# Patient Record
Sex: FEMALE | Race: ASIAN | Employment: UNEMPLOYED
[De-identification: names, ages, dates, MRNs, and addresses within clinical notes are randomized per-mention and may not be internally consistent; named-entity substitution may affect disease eponyms.]

---

## 2022-11-21 SDOH — HEALTH STABILITY: PHYSICAL HEALTH: ON AVERAGE, HOW MANY MINUTES DO YOU ENGAGE IN EXERCISE AT THIS LEVEL?: 30 MIN

## 2022-11-21 SDOH — HEALTH STABILITY: PHYSICAL HEALTH: ON AVERAGE, HOW MANY DAYS PER WEEK DO YOU ENGAGE IN MODERATE TO STRENUOUS EXERCISE (LIKE A BRISK WALK)?: 5 DAYS

## 2022-11-22 ENCOUNTER — OFFICE VISIT (OUTPATIENT)
Dept: INTERNAL MEDICINE CLINIC | Facility: CLINIC | Age: 22
End: 2022-11-22
Payer: COMMERCIAL

## 2022-11-22 VITALS
WEIGHT: 105 LBS | HEART RATE: 110 BPM | SYSTOLIC BLOOD PRESSURE: 98 MMHG | HEIGHT: 60 IN | OXYGEN SATURATION: 100 % | DIASTOLIC BLOOD PRESSURE: 58 MMHG | TEMPERATURE: 99 F | BODY MASS INDEX: 20.62 KG/M2

## 2022-11-22 DIAGNOSIS — D50.8 IRON DEFICIENCY ANEMIA SECONDARY TO INADEQUATE DIETARY IRON INTAKE: ICD-10-CM

## 2022-11-22 DIAGNOSIS — R41.840 DIFFICULTY CONCENTRATING: ICD-10-CM

## 2022-11-22 DIAGNOSIS — Z78.9 KETOGENIC DIET: ICD-10-CM

## 2022-11-22 DIAGNOSIS — Z83.49 FAMILY HISTORY OF HYPOTHYROIDISM: ICD-10-CM

## 2022-11-22 DIAGNOSIS — Z76.89 ENCOUNTER TO ESTABLISH CARE: ICD-10-CM

## 2022-11-22 DIAGNOSIS — D50.8 IRON DEFICIENCY ANEMIA SECONDARY TO INADEQUATE DIETARY IRON INTAKE: Primary | ICD-10-CM

## 2022-11-22 DIAGNOSIS — F51.04 PSYCHOPHYSIOLOGICAL INSOMNIA: ICD-10-CM

## 2022-11-22 LAB
BASOPHILS # BLD: 0 K/UL (ref 0–0.2)
BASOPHILS NFR BLD: 0 % (ref 0–2)
DIFFERENTIAL METHOD BLD: ABNORMAL
EOSINOPHIL # BLD: 0.2 K/UL (ref 0–0.8)
EOSINOPHIL NFR BLD: 2 % (ref 0.5–7.8)
ERYTHROCYTE [DISTWIDTH] IN BLOOD BY AUTOMATED COUNT: 17.2 % (ref 11.9–14.6)
HCT VFR BLD AUTO: 35.8 % (ref 35.8–46.3)
HGB BLD-MCNC: 10.6 G/DL (ref 11.7–15.4)
IMM GRANULOCYTES # BLD AUTO: 0 K/UL (ref 0–0.5)
IMM GRANULOCYTES NFR BLD AUTO: 0 % (ref 0–5)
LYMPHOCYTES # BLD: 1.1 K/UL (ref 0.5–4.6)
LYMPHOCYTES NFR BLD: 14 % (ref 13–44)
MCH RBC QN AUTO: 23.9 PG (ref 26.1–32.9)
MCHC RBC AUTO-ENTMCNC: 29.6 G/DL (ref 31.4–35)
MCV RBC AUTO: 80.8 FL (ref 82–102)
MONOCYTES # BLD: 0.4 K/UL (ref 0.1–1.3)
MONOCYTES NFR BLD: 5 % (ref 4–12)
NEUTS SEG # BLD: 6.1 K/UL (ref 1.7–8.2)
NEUTS SEG NFR BLD: 78 % (ref 43–78)
NRBC # BLD: 0 K/UL (ref 0–0.2)
PLATELET # BLD AUTO: 396 K/UL (ref 150–450)
PMV BLD AUTO: 9.3 FL (ref 9.4–12.3)
RBC # BLD AUTO: 4.43 M/UL (ref 4.05–5.2)
WBC # BLD AUTO: 7.8 K/UL (ref 4.3–11.1)

## 2022-11-22 PROCEDURE — 99203 OFFICE O/P NEW LOW 30 MIN: CPT | Performed by: PHYSICIAN ASSISTANT

## 2022-11-22 ASSESSMENT — ENCOUNTER SYMPTOMS
SHORTNESS OF BREATH: 0
DIARRHEA: 0
CONSTIPATION: 0

## 2022-11-22 ASSESSMENT — PATIENT HEALTH QUESTIONNAIRE - PHQ9
SUM OF ALL RESPONSES TO PHQ QUESTIONS 1-9: 0
SUM OF ALL RESPONSES TO PHQ9 QUESTIONS 1 & 2: 0
1. LITTLE INTEREST OR PLEASURE IN DOING THINGS: 0
2. FEELING DOWN, DEPRESSED OR HOPELESS: 0

## 2022-11-22 NOTE — PROGRESS NOTES
Declan Santana (:  2000) is a 23 y.o. female,New patient, here for evaluation of the following chief complaint(s):  New Patient (Pt c/o brain fog for the past year. She also reports only getting about 4 hours of sleep a night and decreased concentration. )         ASSESSMENT/PLAN:  1. Iron deficiency anemia secondary to inadequate dietary iron intake  -     CBC with Auto Differential; Future  -     Comprehensive Metabolic Panel; Future  -     Vitamin B12; Future  -     Vitamin D 25 Hydroxy; Future  2. Family history of hypothyroidism  -     TSH; Future  3. Psychophysiological insomnia  4. Difficulty concentrating  -     Vitamin B12; Future  -     Vitamin D 25 Hydroxy; Future  5. Ketogenic diet  -     Vitamin B12; Future  -     Vitamin D 25 Hydroxy; Future  6. Encounter to establish care      Patient Instructions   Recommend OTC Melatonin 3-5 mg nightly x 3-6 months  Strongly encouraged her to establish with a counselor who specializes in working with college kids under similar stressors as herself  Discouraged keto diet as it deprives her body of necessary fuel from healthy carbohydrates like whole grains, etc  Discussed the negative mis-firings (overproduction of glucagon from the liver) in the body that will occur with chronic depravation of carbohydrates when she is doing so much and requiring a lot of her body's systems  Continue chronic medications as prescribed  Reviewed options to try SSRI medication to help down regulate her stress but she prefers to try suggestions outlined above prior to taking a medication  Encouraged to SLOW things down and not try to do SO much  Specifically encouraged to focus on fun and relaxing activities over the school breaks like current  and upcoming [facility] break to give her brain a chance to relax and recooperate      Return if symptoms worsen or fail to improve.          Subjective   SUBJECTIVE/OBJECTIVE:  HPI  Patient presents today to get established with a new PCP since she is aging out of the pediatric system. She describes brain fog for the past year, has trouble concentrating, difficulty falling asleep due to mind feeling overloaded, and admits to unhealthy eating habits with lack of food on days she's focused or stressed about a project/assignment and binge eating if she's frustrated/disappointed with herself over a grade she feels isn't good enough. She reports a life long history of anxiousness with excessive worrying, but says it's had a more direct impact on her health over the past few months. Mother reports she's been following a keto diet for ~ 1 year and is concerned over this. Patient states she does take time out to exercise and recognizes the importance of balance in that way. 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 She admits to being a perfectionist and often gets over focused on doing her absolute best on each assignment given to her. She does not usually take breaks during the summer - often involved in some form of internship.      Past Medical History:   Diagnosis Date    Iron deficiency anemia secondary to inadequate dietary iron intake        Past Surgical History:   Procedure Laterality Date    WISDOM TOOTH EXTRACTION  2022       Family History   Problem Relation Age of Onset    Hypothyroidism Mother     Hypertension Father     No Known Problems Brother     Diabetes type 2  Maternal Grandmother     Diabetes type 2  Paternal Grandmother     No Known Problems Paternal Grandfather        Social History     Tobacco Use    Smoking status: Never    Smokeless tobacco: Never   Vaping Use    Vaping Use: Never used   Substance Use Topics    Alcohol use: Never    Drug use: Never       Current Outpatient Medications   Medication Sig Dispense Refill    Iron-Vit C-Vit B12-Folic Acid (IRON 377 PLUS PO) Take 1 tablet by mouth daily       No current facility-administered medications for this visit. No Known Allergies      Review of Systems   Constitutional:  Positive for fatigue. Negative for unexpected weight change. Respiratory:  Negative for shortness of breath. Cardiovascular:  Negative for chest pain, palpitations and leg swelling. Gastrointestinal:  Negative for constipation and diarrhea. Endocrine: Positive for cold intolerance. Negative for heat intolerance. Positive for hair loss   Musculoskeletal:  Negative for myalgias. Neurological:  Positive for headaches (occasional). Negative for dizziness and numbness. Psychiatric/Behavioral:  Positive for decreased concentration. Negative for dysphoric mood, self-injury, sleep disturbance and suicidal ideas. The patient is nervous/anxious. The patient is not hyperactive. BP (!) 98/58 (Site: Left Upper Arm, Position: Sitting, Cuff Size: Small Adult)   Pulse (!) 110   Temp 99 °F (37.2 °C) (Temporal)   Ht 5' (1.524 m)   Wt 105 lb (47.6 kg)   LMP 10/26/2022   SpO2 100%   BMI 20.51 kg/m²       Objective   Physical Exam  Constitutional:       Appearance: Normal appearance. HENT:      Head: Normocephalic and atraumatic. Eyes:      Conjunctiva/sclera: Conjunctivae normal.      Pupils: Pupils are equal, round, and reactive to light. Neck:      Vascular: No carotid bruit. Cardiovascular:      Rate and Rhythm: Normal rate and regular rhythm. Heart sounds: Normal heart sounds. Pulmonary:      Effort: Pulmonary effort is normal.      Breath sounds: Normal breath sounds. Musculoskeletal:         General: Normal range of motion. Cervical back: Normal range of motion. Skin:     General: Skin is warm and dry. Neurological:      Mental Status: She is alert and oriented to person, place, and time. Psychiatric:         Mood and Affect: Mood normal.         Behavior: Behavior normal.         Thought Content:  Thought content normal.         Judgment: Judgment normal.          On this date 11/22/2022 I have spent 30 minutes reviewing previous notes, test results and face to face with the patient discussing the diagnosis and importance of compliance with the treatment plan as well as documenting on the day of the visit. An electronic signature was used to authenticate this note.     --Hu Hatfield PA-C

## 2022-11-22 NOTE — PATIENT INSTRUCTIONS
Recommend OTC Melatonin 3-5 mg nightly x 3-6 months  Strongly encouraged her to establish with a counselor who specializes in working with college kids under similar stressors as herself  Discouraged keto diet as it deprives her body of necessary fuel from healthy carbohydrates like whole grains, etc  Discussed the negative mis-firings (overproduction of glucagon from the liver) in the body that will occur with chronic depravation of carbohydrates when she is doing so much and requiring a lot of her body's systems  Continue chronic medications as prescribed  Reviewed options to try SSRI medication to help down regulate her stress but she prefers to try suggestions outlined above prior to taking a medication  Encouraged to SLOW things down and not try to do SO much  Specifically encouraged to focus on fun and relaxing activities over the school breaks like current Thanksgiving and upcoming [facility] break to give her brain a chance to relax and recooperate

## 2022-11-23 LAB
25(OH)D3 SERPL-MCNC: 20.3 NG/ML (ref 30–100)
ALBUMIN SERPL-MCNC: 3.8 G/DL (ref 3.5–5)
ALBUMIN/GLOB SERPL: 1 {RATIO} (ref 0.4–1.6)
ALP SERPL-CCNC: 54 U/L (ref 50–136)
ALT SERPL-CCNC: 28 U/L (ref 12–65)
ANION GAP SERPL CALC-SCNC: 7 MMOL/L (ref 2–11)
AST SERPL-CCNC: 16 U/L (ref 15–37)
BILIRUB SERPL-MCNC: 0.1 MG/DL (ref 0.2–1.1)
BUN SERPL-MCNC: 11 MG/DL (ref 6–23)
CALCIUM SERPL-MCNC: 9.1 MG/DL (ref 8.3–10.4)
CHLORIDE SERPL-SCNC: 106 MMOL/L (ref 101–110)
CO2 SERPL-SCNC: 27 MMOL/L (ref 21–32)
CREAT SERPL-MCNC: 0.6 MG/DL (ref 0.6–1)
GLOBULIN SER CALC-MCNC: 3.8 G/DL (ref 2.8–4.5)
GLUCOSE SERPL-MCNC: 88 MG/DL (ref 65–100)
POTASSIUM SERPL-SCNC: 4.5 MMOL/L (ref 3.5–5.1)
PROT SERPL-MCNC: 7.6 G/DL (ref 6.3–8.2)
SODIUM SERPL-SCNC: 140 MMOL/L (ref 133–143)
TSH, 3RD GENERATION: 2.16 UIU/ML (ref 0.36–3.74)
VIT B12 SERPL-MCNC: 1600 PG/ML (ref 193–986)

## 2022-11-28 ENCOUNTER — TELEPHONE (OUTPATIENT)
Dept: INTERNAL MEDICINE CLINIC | Facility: CLINIC | Age: 22
End: 2022-11-28

## 2022-11-28 NOTE — RESULT ENCOUNTER NOTE
Vitamin d level is too low - needs to start OTC vitamin d3 2000 iu daily. Anemia appears to be stable but would prefer higher levels. When did her last menstrual cycle start and end? Will need follow-up with me in 4 months with nonfasting labs done 3-7 days prior - please schedule.

## 2022-11-28 NOTE — TELEPHONE ENCOUNTER
----- Message from Savannah Alexander PA-C sent at 11/27/2022 11:52 PM EST -----  Vitamin d level is too low - needs to start OTC vitamin d3 2000 iu daily. Anemia appears to be stable but would prefer higher levels. When did her last menstrual cycle start and end? Will need follow-up with me in 4 months with nonfasting labs done 3-7 days prior - please schedule.

## 2022-11-29 NOTE — RESULT ENCOUNTER NOTE
I'd like her to switch to prescription iron supplement if she is willing. I can send to pharmacy of her choice. Don't see that her follow-up appointments - visit & labs ahead got scheduled.

## 2022-12-13 ENCOUNTER — TELEPHONE (OUTPATIENT)
Dept: INTERNAL MEDICINE CLINIC | Facility: CLINIC | Age: 22
End: 2022-12-13

## 2022-12-13 NOTE — TELEPHONE ENCOUNTER
----- Message from Red Baltazar PA-C sent at 12/13/2022 12:37 PM EST -----  Still don't see that any blood work is scheduled prior to her appointment. .. ?

## 2023-02-23 DIAGNOSIS — Z83.49 FAMILY HISTORY OF HYPOTHYROIDISM: ICD-10-CM

## 2023-02-23 DIAGNOSIS — D50.8 IRON DEFICIENCY ANEMIA SECONDARY TO INADEQUATE DIETARY IRON INTAKE: Primary | ICD-10-CM

## 2023-02-23 DIAGNOSIS — D50.8 IRON DEFICIENCY ANEMIA SECONDARY TO INADEQUATE DIETARY IRON INTAKE: ICD-10-CM

## 2023-02-23 LAB
25(OH)D3 SERPL-MCNC: 19.6 NG/ML (ref 30–100)
ALBUMIN SERPL-MCNC: 3.3 G/DL (ref 3.5–5)
ALBUMIN/GLOB SERPL: 0.9 (ref 0.4–1.6)
ALP SERPL-CCNC: 43 U/L (ref 50–136)
ALT SERPL-CCNC: 15 U/L (ref 12–65)
ANION GAP SERPL CALC-SCNC: 0 MMOL/L (ref 2–11)
AST SERPL-CCNC: 17 U/L (ref 15–37)
BASOPHILS # BLD: 0 K/UL (ref 0–0.2)
BASOPHILS NFR BLD: 1 % (ref 0–2)
BILIRUB SERPL-MCNC: 0.3 MG/DL (ref 0.2–1.1)
BUN SERPL-MCNC: 16 MG/DL (ref 6–23)
CALCIUM SERPL-MCNC: 8.8 MG/DL (ref 8.3–10.4)
CHLORIDE SERPL-SCNC: 107 MMOL/L (ref 101–110)
CHOLEST SERPL-MCNC: 165 MG/DL
CO2 SERPL-SCNC: 29 MMOL/L (ref 21–32)
CREAT SERPL-MCNC: 0.7 MG/DL (ref 0.6–1)
DIFFERENTIAL METHOD BLD: ABNORMAL
EOSINOPHIL # BLD: 0.1 K/UL (ref 0–0.8)
EOSINOPHIL NFR BLD: 2 % (ref 0.5–7.8)
ERYTHROCYTE [DISTWIDTH] IN BLOOD BY AUTOMATED COUNT: 15.4 % (ref 11.9–14.6)
GLOBULIN SER CALC-MCNC: 3.8 G/DL (ref 2.8–4.5)
GLUCOSE SERPL-MCNC: 92 MG/DL (ref 65–100)
HCT VFR BLD AUTO: 34.2 % (ref 35.8–46.3)
HDLC SERPL-MCNC: 56 MG/DL (ref 40–60)
HDLC SERPL: 2.9
HGB BLD-MCNC: 10.6 G/DL (ref 11.7–15.4)
IMM GRANULOCYTES # BLD AUTO: 0 K/UL (ref 0–0.5)
IMM GRANULOCYTES NFR BLD AUTO: 0 % (ref 0–5)
LDLC SERPL CALC-MCNC: 94.2 MG/DL
LYMPHOCYTES # BLD: 0.8 K/UL (ref 0.5–4.6)
LYMPHOCYTES NFR BLD: 10 % (ref 13–44)
MCH RBC QN AUTO: 25.4 PG (ref 26.1–32.9)
MCHC RBC AUTO-ENTMCNC: 31 G/DL (ref 31.4–35)
MCV RBC AUTO: 81.8 FL (ref 82–102)
MONOCYTES # BLD: 0.6 K/UL (ref 0.1–1.3)
MONOCYTES NFR BLD: 7 % (ref 4–12)
NEUTS SEG # BLD: 6.6 K/UL (ref 1.7–8.2)
NEUTS SEG NFR BLD: 80 % (ref 43–78)
NRBC # BLD: 0 K/UL (ref 0–0.2)
PLATELET # BLD AUTO: 331 K/UL (ref 150–450)
PMV BLD AUTO: 8.9 FL (ref 9.4–12.3)
POTASSIUM SERPL-SCNC: 4 MMOL/L (ref 3.5–5.1)
PROT SERPL-MCNC: 7.1 G/DL (ref 6.3–8.2)
RBC # BLD AUTO: 4.18 M/UL (ref 4.05–5.2)
SODIUM SERPL-SCNC: 136 MMOL/L (ref 133–143)
TRIGL SERPL-MCNC: 74 MG/DL (ref 35–150)
TSH, 3RD GENERATION: 1.35 UIU/ML (ref 0.36–3.74)
VLDLC SERPL CALC-MCNC: 14.8 MG/DL (ref 6–23)
WBC # BLD AUTO: 8.2 K/UL (ref 4.3–11.1)

## 2023-03-06 ENCOUNTER — PATIENT MESSAGE (OUTPATIENT)
Dept: INTERNAL MEDICINE CLINIC | Facility: CLINIC | Age: 23
End: 2023-03-06

## 2023-03-06 ENCOUNTER — TELEMEDICINE (OUTPATIENT)
Dept: INTERNAL MEDICINE CLINIC | Facility: CLINIC | Age: 23
End: 2023-03-06
Payer: COMMERCIAL

## 2023-03-06 DIAGNOSIS — D50.8 IRON DEFICIENCY ANEMIA SECONDARY TO INADEQUATE DIETARY IRON INTAKE: Primary | ICD-10-CM

## 2023-03-06 DIAGNOSIS — E55.9 VITAMIN D DEFICIENCY: ICD-10-CM

## 2023-03-06 DIAGNOSIS — F41.8 SITUATIONAL ANXIETY: ICD-10-CM

## 2023-03-06 DIAGNOSIS — R41.840 DIFFICULTY CONCENTRATING: ICD-10-CM

## 2023-03-06 PROCEDURE — 99214 OFFICE O/P EST MOD 30 MIN: CPT | Performed by: PHYSICIAN ASSISTANT

## 2023-03-06 ASSESSMENT — ENCOUNTER SYMPTOMS
ABDOMINAL PAIN: 1
BLOOD IN STOOL: 1
VOMITING: 0
ABDOMINAL DISTENTION: 0
NAUSEA: 0
CONSTIPATION: 1
DIARRHEA: 0

## 2023-03-06 NOTE — PATIENT INSTRUCTIONS
Increase vitamin d supplementation to 4000 iu daily (2 x 2000 iu capsules)  Suggest trial move of supplements to evening/bedtime to avoid direct contact with caffienated beverages  Continue chronic medications as prescribed  Agreed that counseling is an important step  Discussed option for treating anxiety with Lexapro  Will need to refer to psychiatry for formal evaluation if stimulants are felt to be needed for ADD Telephone Encounter by Lainey Waters MD at 05/15/18 09:44 AM     Author:  Lainey Waters MD Service:  (none) Author Type:  Physician     Filed:  05/15/18 09:44 AM Encounter Date:  5/14/2018 Status:  Signed     :  Lainey Waters MD (Physician)            Please call this patient today.[RA1.1M]      Revision History        User Key Date/Time User Provider Type Action    > RA1.1 05/15/18 09:44 AM Lainey Waters MD Physician Sign    M - Manual

## 2023-03-06 NOTE — PROGRESS NOTES
Nya Victor (:  2000) is a Established patient, here for evaluation of the following:  Chief Complaint   Patient presents with    Follow-up     Iron Deficiency, Vit D Def, Concentration Deficit    Discuss Labs         Assessment & Plan   Below is the assessment and plan developed based on review of pertinent history, physical exam, labs, studies, and medications.  1. Iron deficiency anemia secondary to inadequate dietary iron intake  -     CBC with Auto Differential; Future  -     Ferritin; Future  -     Transferrin Saturation; Future  2. Difficulty concentrating  -     External Referral To Counseling Services  3. Vitamin D deficiency  -     Vitamin D 25 Hydroxy; Future  4. Situational anxiety  -     External Referral To Counseling Services      Patient Instructions   Increase vitamin d supplementation to 4000 iu daily (2 x 2000 iu capsules)  Suggest trial move of supplements to evening/bedtime to avoid direct contact with caffienated beverages  Continue chronic medications as prescribed  Agreed that counseling is an important step  Discussed option for treating anxiety with Lexapro  Will need to refer to psychiatry for formal evaluation if stimulants are felt to be needed for ADD      Return in about 3 months (around 2023) for routine f/u - patient to call back to schedule.       Subjective   HPI  Patient is here for follow-up of vitamin d deficiency.  The current state of this condition is no significant medication side effects noted and stable on OTC vitamin d3 2000 iu daily - taking as prescribed.  She notices not feeling as tired since starting supplementation.    Vit D, 25-Hydroxy   Date Value Ref Range Status   2023 19.6 (L) 30.0 - 100.0 ng/mL Final   2022 20.3 (L) 30.0 - 100.0 ng/mL Final       Patient is here for follow-up of iron deficiency anemia.  The current state of this condition is no significant medication side effects noted and stable on Integra daily - taking as  prescribed. She notes feeling less tired since starting new iron supplement + vitamin d supplement. She reports monthly menstrual cycles but only 1 day of heavy bleeding and only 3-4 days duration. She admits to not much improvement in her meal consistency due to how busy she is but states her mom has been traveling down more often to provide her with home cooked meals. No results found for: IRON  No results found for: TIBC  No results found for: Merry Kugel  No results found for: FERRITIN  Lab Results   Component Value Date    [id number] 1600 (H) 11/22/2022     No results found for: FOLATE    Lab Results   Component Value Date    WBC 8.2 02/23/2023    WBC 7.8 11/22/2022     Lab Results   Component Value Date    HGB 10.6 (L) 02/23/2023    HGB 10.6 (L) 11/22/2022     Lab Results   Component Value Date    HCT 34.2 (L) 02/23/2023    HCT 35.8 11/22/2022     Lab Results   Component Value Date    MCV 81.8 (L) 02/23/2023    MCV 80.8 (L) 11/22/2022     Lab Results   Component Value Date     02/23/2023     11/22/2022       Patient is here for follow-up of situational anxiety w/ difficulty concentrating. The current state of this condition is poorly controlled - not currently on medications for this problem. She has made efforts to drop extracurricular group activities and put herself on more of a routine but finds herself still struggling and notes that her grades are starting to fall as a result. She would like to pursue counseling but states the counselor at [facility] is not covered by her insurance but she has found one who is covered by [facility] and is local to her so she will send me their info so we can refer. Review of Systems   Gastrointestinal:  Positive for abdominal pain (occasionally when constipation is severe), blood in stool (occasionally after straining) and constipation. Negative for abdominal distention, diarrhea, nausea and vomiting.         Negative for heartburn Psychiatric/Behavioral:  Positive for decreased concentration and sleep disturbance. Negative for dysphoric mood. The patient is nervous/anxious. Objective   Patient-Reported Vitals  No data recorded     Physical Exam  Constitutional:       Appearance: Normal appearance. HENT:      Head: Normocephalic and atraumatic. Neurological:      Mental Status: She is alert and oriented to person, place, and time. Psychiatric:         Mood and Affect: Mood normal.         Behavior: Behavior normal.         Thought Content: Thought content normal.         Judgment: Judgment normal.            On this date 3/6/2023 I have spent 30 minutes reviewing previous notes, test results and face to face (virtual) with the patient discussing the diagnosis and importance of compliance with the treatment plan as well as documenting on the day of the visitElis Juarez was evaluated through a synchronous (real-time) audio-video encounter. The patient (or guardian if applicable) is aware that this is a billable service, which includes applicable co-pays. This Virtual Visit was conducted with patient's (and/or legal guardian's) consent. The visit was conducted pursuant to the emergency declaration under the [facility], [facility] authority and the [employer] Act. Patient identification was verified, and a caregiver was present when appropriate.    The patient was located at Other: [address]  Provider was located at [facility] [address],  [address] [facility] NAVYA Santos

## 2023-03-07 NOTE — TELEPHONE ENCOUNTER
From: Jose Victor  To: Kaylah Metzger  Sent: 3/6/2023 10:53 PM EST  Subject: Referral for counseling - Bryant Gonzalez,    The closest counseling/psychiatric office I was able to find to my location in [facility] was the [facility] . They are also covered under my insurance by [employer] I have provided the link for the website where the doctor referral form is located. [email or web address].     Hopefully that link should take you to directly to the referral form. Thanks for submitting the referral, and please let me know if there are any concerns regarding this counseling place!      Gloria Longoria

## 2023-03-07 NOTE — ADDENDUM NOTE
Addended by: Hubert Isabel on: 3/7/2023 04:52 PM     Modules accepted: Orders no dermatitis, no environmental allergies, no food allergies, no immunosuppressive disorder, and no pruritus.

## 2023-03-12 RX ORDER — IRON FUM,PS CMP/VIT C/NIACIN 125-40-3MG
1 CAPSULE ORAL DAILY
Qty: 30 CAPSULE | Refills: 5 | Status: CANCELLED | OUTPATIENT
Start: 2023-03-12

## 2024-06-25 ENCOUNTER — OFFICE VISIT (OUTPATIENT)
Dept: INTERNAL MEDICINE CLINIC | Facility: CLINIC | Age: 24
End: 2024-06-25
Payer: COMMERCIAL

## 2024-06-25 VITALS
HEART RATE: 94 BPM | DIASTOLIC BLOOD PRESSURE: 58 MMHG | SYSTOLIC BLOOD PRESSURE: 108 MMHG | TEMPERATURE: 98.4 F | BODY MASS INDEX: 21.6 KG/M2 | HEIGHT: 60 IN | OXYGEN SATURATION: 99 % | WEIGHT: 110 LBS

## 2024-06-25 DIAGNOSIS — E55.9 VITAMIN D DEFICIENCY: ICD-10-CM

## 2024-06-25 DIAGNOSIS — F41.8 SITUATIONAL ANXIETY: ICD-10-CM

## 2024-06-25 DIAGNOSIS — D50.8 IRON DEFICIENCY ANEMIA SECONDARY TO INADEQUATE DIETARY IRON INTAKE: ICD-10-CM

## 2024-06-25 DIAGNOSIS — Z02.89 ENCOUNTER FOR COMPLETION OF FORM WITH PATIENT: ICD-10-CM

## 2024-06-25 DIAGNOSIS — Z00.00 ROUTINE PHYSICAL EXAMINATION: Primary | ICD-10-CM

## 2024-06-25 DIAGNOSIS — F90.2 ATTENTION DEFICIT HYPERACTIVITY DISORDER (ADHD), COMBINED TYPE: ICD-10-CM

## 2024-06-25 DIAGNOSIS — Z23 NEED FOR DIPHTHERIA-TETANUS-PERTUSSIS (TDAP) VACCINE: ICD-10-CM

## 2024-06-25 PROCEDURE — 90471 IMMUNIZATION ADMIN: CPT | Performed by: PHYSICIAN ASSISTANT

## 2024-06-25 PROCEDURE — 99395 PREV VISIT EST AGE 18-39: CPT | Performed by: PHYSICIAN ASSISTANT

## 2024-06-25 PROCEDURE — 90715 TDAP VACCINE 7 YRS/> IM: CPT | Performed by: PHYSICIAN ASSISTANT

## 2024-06-25 RX ORDER — PROPRANOLOL HYDROCHLORIDE 10 MG/1
10 TABLET ORAL DAILY PRN
COMMUNITY
Start: 2024-06-20

## 2024-06-25 RX ORDER — DEXTROAMPHETAMINE SACCHARATE, AMPHETAMINE ASPARTATE, DEXTROAMPHETAMINE SULFATE, AND AMPHETAMINE SULFATE 2.5; 2.5; 2.5; 2.5 MG/1; MG/1; MG/1; MG/1
10 TABLET ORAL PRN
COMMUNITY
Start: 2023-04-03

## 2024-06-25 RX ORDER — TRAZODONE HYDROCHLORIDE 50 MG/1
50 TABLET ORAL NIGHTLY PRN
COMMUNITY
Start: 2024-05-28

## 2024-06-25 RX ORDER — LISDEXAMFETAMINE DIMESYLATE 30 MG/1
30 CAPSULE ORAL EVERY MORNING
COMMUNITY
Start: 2023-03-20

## 2024-06-25 SDOH — ECONOMIC STABILITY: FOOD INSECURITY: WITHIN THE PAST 12 MONTHS, YOU WORRIED THAT YOUR FOOD WOULD RUN OUT BEFORE YOU GOT MONEY TO BUY MORE.: NEVER TRUE

## 2024-06-25 SDOH — ECONOMIC STABILITY: HOUSING INSECURITY
IN THE LAST 12 MONTHS, WAS THERE A TIME WHEN YOU DID NOT HAVE A STEADY PLACE TO SLEEP OR SLEPT IN A SHELTER (INCLUDING NOW)?: NO

## 2024-06-25 SDOH — ECONOMIC STABILITY: FOOD INSECURITY: WITHIN THE PAST 12 MONTHS, THE FOOD YOU BOUGHT JUST DIDN'T LAST AND YOU DIDN'T HAVE MONEY TO GET MORE.: NEVER TRUE

## 2024-06-25 SDOH — ECONOMIC STABILITY: TRANSPORTATION INSECURITY
IN THE PAST 12 MONTHS, HAS LACK OF TRANSPORTATION KEPT YOU FROM MEETINGS, WORK, OR FROM GETTING THINGS NEEDED FOR DAILY LIVING?: NO

## 2024-06-25 SDOH — ECONOMIC STABILITY: INCOME INSECURITY: HOW HARD IS IT FOR YOU TO PAY FOR THE VERY BASICS LIKE FOOD, HOUSING, MEDICAL CARE, AND HEATING?: NOT HARD AT ALL

## 2024-06-25 ASSESSMENT — PATIENT HEALTH QUESTIONNAIRE - PHQ9
SUM OF ALL RESPONSES TO PHQ QUESTIONS 1-9: 0
SUM OF ALL RESPONSES TO PHQ QUESTIONS 1-9: 0
SUM OF ALL RESPONSES TO PHQ9 QUESTIONS 1 & 2: 0
SUM OF ALL RESPONSES TO PHQ QUESTIONS 1-9: 0
SUM OF ALL RESPONSES TO PHQ9 QUESTIONS 1 & 2: 0
SUM OF ALL RESPONSES TO PHQ QUESTIONS 1-9: 0
2. FEELING DOWN, DEPRESSED OR HOPELESS: NOT AT ALL
1. LITTLE INTEREST OR PLEASURE IN DOING THINGS: NOT AT ALL
2. FEELING DOWN, DEPRESSED OR HOPELESS: NOT AT ALL
1. LITTLE INTEREST OR PLEASURE IN DOING THINGS: NOT AT ALL

## 2024-06-27 RX ORDER — IRON FUM,PS CMP/VIT C/NIACIN 125-40-3MG
1 CAPSULE ORAL DAILY
Qty: 30 CAPSULE | Refills: 11 | Status: SHIPPED | OUTPATIENT
Start: 2024-06-27

## 2024-06-27 ASSESSMENT — ENCOUNTER SYMPTOMS
EYE PAIN: 0
DIARRHEA: 0
EYE REDNESS: 0
CONSTIPATION: 0
SORE THROAT: 0
SHORTNESS OF BREATH: 0
VOMITING: 0
NAUSEA: 0
ABDOMINAL PAIN: 0
PHOTOPHOBIA: 0
BACK PAIN: 0
EYE ITCHING: 0
EYE DISCHARGE: 0
RHINORRHEA: 0
WHEEZING: 0
BLOOD IN STOOL: 0
COUGH: 0
VOICE CHANGE: 0

## 2024-06-28 ENCOUNTER — TELEPHONE (OUTPATIENT)
Dept: INTERNAL MEDICINE CLINIC | Facility: CLINIC | Age: 24
End: 2024-06-28

## 2024-06-28 ENCOUNTER — NURSE ONLY (OUTPATIENT)
Dept: INTERNAL MEDICINE CLINIC | Facility: CLINIC | Age: 24
End: 2024-06-28

## 2024-06-28 DIAGNOSIS — E55.9 VITAMIN D DEFICIENCY: ICD-10-CM

## 2024-06-28 DIAGNOSIS — Z00.00 ROUTINE PHYSICAL EXAMINATION: ICD-10-CM

## 2024-06-28 DIAGNOSIS — D50.8 IRON DEFICIENCY ANEMIA SECONDARY TO INADEQUATE DIETARY IRON INTAKE: ICD-10-CM

## 2024-06-28 LAB
25(OH)D3 SERPL-MCNC: 13.7 NG/ML (ref 30–100)
ALBUMIN SERPL-MCNC: 3.6 G/DL (ref 3.5–5)
ALBUMIN/GLOB SERPL: 1.1 (ref 1–1.9)
ALP SERPL-CCNC: 47 U/L (ref 35–104)
ALT SERPL-CCNC: 11 U/L (ref 12–65)
ANION GAP SERPL CALC-SCNC: 9 MMOL/L (ref 9–18)
APPEARANCE UR: CLEAR
AST SERPL-CCNC: 19 U/L (ref 15–37)
BACTERIA URNS QL MICRO: ABNORMAL /HPF
BASOPHILS # BLD: 0 K/UL (ref 0–0.2)
BASOPHILS NFR BLD: 0 % (ref 0–2)
BILIRUB SERPL-MCNC: <0.2 MG/DL (ref 0–1.2)
BILIRUB UR QL: NEGATIVE
BUN SERPL-MCNC: 10 MG/DL (ref 6–23)
CALCIUM SERPL-MCNC: 9.2 MG/DL (ref 8.8–10.2)
CASTS URNS QL MICRO: 0 /LPF
CHLORIDE SERPL-SCNC: 105 MMOL/L (ref 98–107)
CHOLEST SERPL-MCNC: 165 MG/DL (ref 0–200)
CO2 SERPL-SCNC: 25 MMOL/L (ref 20–28)
COLOR UR: ABNORMAL
CREAT SERPL-MCNC: 0.63 MG/DL (ref 0.6–1.1)
CRYSTALS URNS QL MICRO: 0 /LPF
DIFFERENTIAL METHOD BLD: ABNORMAL
EOSINOPHIL # BLD: 0.4 K/UL (ref 0–0.8)
EOSINOPHIL NFR BLD: 4 % (ref 0.5–7.8)
EPI CELLS #/AREA URNS HPF: ABNORMAL /HPF
ERYTHROCYTE [DISTWIDTH] IN BLOOD BY AUTOMATED COUNT: 14.3 % (ref 11.9–14.6)
FERRITIN SERPL-MCNC: 6 NG/ML (ref 8–388)
GLOBULIN SER CALC-MCNC: 3.3 G/DL (ref 2.3–3.5)
GLUCOSE SERPL-MCNC: 94 MG/DL (ref 70–99)
GLUCOSE UR STRIP.AUTO-MCNC: NEGATIVE MG/DL
HCT VFR BLD AUTO: 33 % (ref 35.8–46.3)
HDLC SERPL-MCNC: 54 MG/DL (ref 40–60)
HDLC SERPL: 3 (ref 0–5)
HGB BLD-MCNC: 10.2 G/DL (ref 11.7–15.4)
HGB UR QL STRIP: NEGATIVE
IMM GRANULOCYTES # BLD AUTO: 0 K/UL (ref 0–0.5)
IMM GRANULOCYTES NFR BLD AUTO: 0 % (ref 0–5)
IRON SATN MFR SERPL: 7 % (ref 20–50)
IRON SERPL-MCNC: 27 UG/DL (ref 35–100)
KETONES UR QL STRIP.AUTO: NEGATIVE MG/DL
LDLC SERPL CALC-MCNC: 82 MG/DL (ref 0–100)
LEUKOCYTE ESTERASE UR QL STRIP.AUTO: NEGATIVE
LYMPHOCYTES # BLD: 1.7 K/UL (ref 0.5–4.6)
LYMPHOCYTES NFR BLD: 19 % (ref 13–44)
MCH RBC QN AUTO: 26 PG (ref 26.1–32.9)
MCHC RBC AUTO-ENTMCNC: 30.9 G/DL (ref 31.4–35)
MCV RBC AUTO: 84 FL (ref 82–102)
MONOCYTES # BLD: 0.4 K/UL (ref 0.1–1.3)
MONOCYTES NFR BLD: 5 % (ref 4–12)
MUCOUS THREADS URNS QL MICRO: 0 /LPF
NEUTS SEG # BLD: 6.6 K/UL (ref 1.7–8.2)
NEUTS SEG NFR BLD: 72 % (ref 43–78)
NITRITE UR QL STRIP.AUTO: NEGATIVE
NRBC # BLD: 0 K/UL (ref 0–0.2)
OTHER OBSERVATIONS: ABNORMAL
PH UR STRIP: 5.5 (ref 5–9)
PLATELET # BLD AUTO: 340 K/UL (ref 150–450)
PMV BLD AUTO: 9.7 FL (ref 9.4–12.3)
POTASSIUM SERPL-SCNC: 4.5 MMOL/L (ref 3.5–5.1)
PROT SERPL-MCNC: 7 G/DL (ref 6.3–8.2)
PROT UR STRIP-MCNC: NEGATIVE MG/DL
RBC # BLD AUTO: 3.93 M/UL (ref 4.05–5.2)
RBC #/AREA URNS HPF: 0 /HPF
SODIUM SERPL-SCNC: 139 MMOL/L (ref 136–145)
SP GR UR REFRACTOMETRY: 1.01 (ref 1–1.02)
TIBC SERPL-MCNC: 405 UG/DL (ref 240–450)
TRIGL SERPL-MCNC: 146 MG/DL (ref 0–150)
TSH, 3RD GENERATION: 3.38 UIU/ML (ref 0.27–4.2)
UIBC SERPL-MCNC: 378 UG/DL (ref 112–347)
URINE CULTURE IF INDICATED: ABNORMAL
UROBILINOGEN UR QL STRIP.AUTO: 0.2 EU/DL (ref 0.2–1)
VLDLC SERPL CALC-MCNC: 29 MG/DL (ref 6–23)
WBC # BLD AUTO: 9.1 K/UL (ref 4.3–11.1)
WBC URNS QL MICRO: ABNORMAL /HPF

## 2024-06-28 NOTE — TELEPHONE ENCOUNTER
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 Also that she will need a copy of her lab results from labs drawn today and I will contact her when we receive the results to see if she would like to pick them up or have us mail them to her. Copies of forms sent to be scanned into pt's chart.

## 2024-06-28 NOTE — PATIENT INSTRUCTIONS
Reviewed the need for first pap smear since she is sexually active - offered to do it here or refer her to gynecology   Maintain a healthy well balanced diet and regular exercise routine  Continue chronic medications as prescribed  Recommend using condoms for any sexual activity to prevent pregnarncy and also reduce transition of STDs  Counseled that Integra can be ordered on [employer] if that makes it easier while she overseas  Completed health assessment forms for study abroad program    Patient Education        tetanus, diphtheria, acellular pertussis vaccine (Tdap)  Pronunciation:  TET a nus, dif THEER ee a, and ay MAURO juan ler per TUS iss  Brand:  Adacel (Tdap), Boostrix (Tdap)  What is the most important information I should know about this vaccine?  Becoming infected with tetanus, diphtheria, or pertussis is much more dangerous to your health than receiving this vaccine.  What is tetanus, diphtheria, acellular pertussis vaccine (Tdap)?  Tetanus, diphtheria, and pertussis are serious diseases caused by bacteria.  Tetanus (lockjaw) causes painful tightening of the muscles that can lead to \"locking\" of the jaw so the victim cannot open the mouth, swallow, or breathe. Tetanus can lead to death.  Diphtheria can lead to breathing problems, paralysis, heart failure, or death.  Pertussis (whooping cough) causes severe long-lasting episodes of cough that can interfere with eating, drinking, or breathing. Pertussis can lead to pneumonia, seizures, brain damage, and death.  Diphtheria and pertussis are spread from person to person. Tetanus enters the body through a cut or wound.  The tetanus, diphtheria, and acellular pertussis adult vaccine (also called Tdap) is used to help prevent these diseases in people who are 10 to 64 years old.  Like any vaccine, the Tdap vaccine may not provide protection from disease in every person.  What should I discuss with my healthcare provider before receiving this vaccine?  You should not

## 2024-07-15 DIAGNOSIS — D50.8 IRON DEFICIENCY ANEMIA SECONDARY TO INADEQUATE DIETARY IRON INTAKE: ICD-10-CM

## 2024-07-15 DIAGNOSIS — E55.9 VITAMIN D DEFICIENCY: Primary | ICD-10-CM

## 2024-07-15 RX ORDER — ERGOCALCIFEROL 1.25 MG/1
50000 CAPSULE ORAL WEEKLY
Qty: 12 CAPSULE | Refills: 3 | Status: SHIPPED | OUTPATIENT
Start: 2024-07-15

## 2024-08-08 DIAGNOSIS — D50.8 OTHER IRON DEFICIENCY ANEMIA: Primary | ICD-10-CM

## 2024-08-12 NOTE — PROGRESS NOTES
Integra every day but her labs do not support that.  If patient is not absorbing it and she may need to get iron transfusions.  Also, vitamin d levels are lower than they were when we first diagnosed her deficiency. I am concerned with patient going overseas with iron deficiency not well managed because if it continues to get worse it could start to cause health issues. Hematology referral placed for possible iron infusions.    A referral has been placed with [facility] for a Medical Hematology consultation and treatment.      Notes from Referring Provider: N/A    Presented at Tumor Board: No    Other Pertinent Information: N/A